# Patient Record
Sex: MALE | Race: WHITE | Employment: FULL TIME | URBAN - METROPOLITAN AREA
[De-identification: names, ages, dates, MRNs, and addresses within clinical notes are randomized per-mention and may not be internally consistent; named-entity substitution may affect disease eponyms.]

---

## 2023-02-19 NOTE — PROGRESS NOTES
Chief Complaint   Patient presents with    Establish Care    Other     Disk in back, wants to see a specialist     Rash     On the bottom of foot     Referral - General     Gi, for colonscopy            ASSESSMENT/PLAN:  1. Encounter to establish care  VS reviewed     BMI reviewed   All questions answered. F/u discussed. Appropriate healthy lifestyle modifications discussed. 2. Chronic midline low back pain without sciatica  10+ years, has failed all conservative measures and would like to look into options for surgical procedures. Herniated disc, Per exam, likely L5 L4 or L4 L3. No red flags at this time. Referral placed  - [facility] - Florencia Herron MD, Neurosurgery (Spine), [facility]    3. Left foot pain  Rule out bony abnormality of left foot given symptoms  If structure intact and no abnormalities found, will refer to Dr. Hiral De Leon or discussed more sensitive imaging.  - XR FOOT LEFT (MIN 3 VIEWS); Future    4. History of hemorrhoids  5. Anal fissure  Patient has pain at times with defecation with blood streaked on stool. Unsure if there is a hemorrhoid or fissure, deferred exam.  Would like referral to evaluate and treat  - Terrell Oneil MD, Colorectal Surgery, [facility]    6. Blood in stool  Referral placed for consult and possible colonoscopy  Sometimes blood is mixed in stool. No first-degree relatives with colon cancer. No symptoms of night sweats, weight loss or decreased appetite. No abdominal pain. - Juanis Arriola MD, Gastroenterology, [facility]    7. Snores  8. Daytime sleepiness  Referral for sleep study to rule out sleep apnea  - Damaris Noriega MD, Sleep Medicine [facility]    9. Immunization due  Administer Tdap           HPI:  Carrolyn Simmonds is a 42 y.o. (: 1985) here today to establish care and discuss chronic condition management.     Has lumbar disc herniation: 10 years ago- has been through PT, chiro, core strengthening with [email or web address] method, steroid injections and is now considering surgery to maintain a decent baseline quality of life. He is able to exercise and do normal activities of daily living but walking for long period, sitting for long period or lifting anything over 20 pounds causes an issue. Left foot pain. Started months ago. Was wearing tight shoes and walk for long period of time. Feels a hard pebble with each step, worse on hard surfaces versus soft surfaces. Has not been getting worse but stable. H/o anal fissure as well as hemorrhoids. Was seeing a gastroenterologist in [place] and moved [place] just before his colonoscopy appointment for blood in stool. Would like to reestablish with gastroenterology here and get a colonoscopy. Will have records sent. Snores and has daytime sleepiness. Always needs a nap. Never feels well rested. Is unaware of abnormal breathing patterns while sleeping. Would like to rule out sleep apnea. Needs to update Tdap shot      Review of Systems   Constitutional:  Positive for fatigue. Negative for activity change, chills and fever. HENT:  Negative for congestion. Eyes:  Negative for redness. Respiratory:  Negative for cough, chest tightness, shortness of breath and wheezing. Cardiovascular:  Negative for chest pain and palpitations. Gastrointestinal:  Negative for abdominal pain and diarrhea. Genitourinary:  Negative for difficulty urinating. Musculoskeletal:  Positive for back pain and myalgias. Negative for arthralgias. Skin:  Negative for rash. Allergic/Immunologic: Negative for immunocompromised state. Neurological:  Negative for dizziness, light-headedness and headaches. Hematological:  Negative for adenopathy. Psychiatric/Behavioral:  Positive for sleep disturbance. Negative for behavioral problems, self-injury and suicidal ideas. The patient is not nervous/anxious. History reviewed. No pertinent past medical history. History reviewed.  No pertinent family history. Social History     Tobacco Use    Smoking status: Never    Smokeless tobacco: Never   Vaping Use    Vaping Use: Never used   Substance Use Topics    Alcohol use: Yes     Comment: occo    Drug use: Never       New Prescriptions    No medications on file       Meds Prior to visit:  No current outpatient medications on file prior to visit. No current facility-administered medications on file prior to visit. No Known Allergies    OBJECTIVE:  /73   Pulse 70   Temp 97 °F (36.1 °C) (Temporal)   Ht 6' 0.93\" (1.852 m)   Wt 219 lb 3.2 oz (99.4 kg)   BMI 28.97 kg/m²   BP Readings from Last 2 Encounters:   02/21/23 118/73     Wt Readings from Last 3 Encounters:   02/21/23 219 lb 3.2 oz (99.4 kg)       Physical Exam  Vitals and nursing note reviewed. Constitutional:       General: He is not in acute distress. Appearance: Normal appearance. He is well-developed and normal weight. He is not ill-appearing. HENT:      Head: Normocephalic and atraumatic. Right Ear: Tympanic membrane, ear canal and external ear normal. There is no impacted cerumen. Left Ear: Tympanic membrane, ear canal and external ear normal. There is no impacted cerumen. Nose: Nose normal. No congestion or rhinorrhea. Mouth/Throat:      Mouth: Mucous membranes are moist.      Pharynx: Oropharynx is clear. No oropharyngeal exudate or posterior oropharyngeal erythema. Eyes:      General: No scleral icterus. Right eye: No discharge. Left eye: No discharge. Extraocular Movements: Extraocular movements intact. Conjunctiva/sclera: Conjunctivae normal.      Pupils: Pupils are equal, round, and reactive to light. Cardiovascular:      Rate and Rhythm: Normal rate and regular rhythm. Pulses: Normal pulses. Heart sounds: Normal heart sounds. No murmur heard.      Comments: Normal radial and pedal pulses  Pulmonary:      Effort: Pulmonary effort is normal. No respiratory distress. Breath sounds: Normal breath sounds. No wheezing. Chest:      Chest wall: No tenderness. Abdominal:      General: Bowel sounds are normal. There is no distension. Palpations: Abdomen is soft. There is no mass. Tenderness: There is no abdominal tenderness. Comments: Normal liver and spleen, no organomegaly. Musculoskeletal:         General: No tenderness, deformity or signs of injury. Normal range of motion. Cervical back: Normal range of motion and neck supple. Back:       Right lower leg: No edema. Left lower leg: No edema. Feet:       Comments: Range of motion intact in all extremities   Lymphadenopathy:      Cervical: No cervical adenopathy. Skin:     General: Skin is warm and dry. Capillary Refill: Capillary refill takes less than 2 seconds. Findings: No erythema or rash. Neurological:      General: No focal deficit present. Mental Status: He is alert and oriented to person, place, and time. Mental status is at baseline. Sensory: No sensory deficit. Motor: No abnormal muscle tone. Coordination: Coordination normal.   Psychiatric:         Mood and Affect: Mood normal.         Behavior: Behavior normal.         Thought Content: Thought content normal.         Judgment: Judgment normal.      Comments:         Discussed use, benefit, and side effects of prescribed medications. Barriers to medication compliance addressed. All patient questions answered. Pt voiced understanding. RTC No follow-ups on file. No future appointments.       Mary Sam MD  2/21/2023  3:58 PM

## 2023-02-21 ENCOUNTER — OFFICE VISIT (OUTPATIENT)
Dept: PRIMARY CARE CLINIC | Age: 38
End: 2023-02-21
Payer: COMMERCIAL

## 2023-02-21 ENCOUNTER — HOSPITAL ENCOUNTER (OUTPATIENT)
Dept: GENERAL RADIOLOGY | Age: 38
Discharge: HOME | End: 2023-02-21
Payer: COMMERCIAL

## 2023-02-21 ENCOUNTER — HOSPITAL ENCOUNTER (OUTPATIENT)
Age: 38
Discharge: HOME | End: 2023-02-21
Payer: COMMERCIAL

## 2023-02-21 VITALS
TEMPERATURE: 97 F | SYSTOLIC BLOOD PRESSURE: 118 MMHG | HEIGHT: 73 IN | HEART RATE: 70 BPM | BODY MASS INDEX: 29.05 KG/M2 | WEIGHT: 219.2 LBS | DIASTOLIC BLOOD PRESSURE: 73 MMHG

## 2023-02-21 DIAGNOSIS — M79.672 LEFT FOOT PAIN: ICD-10-CM

## 2023-02-21 DIAGNOSIS — K60.2 ANAL FISSURE: ICD-10-CM

## 2023-02-21 DIAGNOSIS — G89.29 CHRONIC MIDLINE LOW BACK PAIN WITHOUT SCIATICA: ICD-10-CM

## 2023-02-21 DIAGNOSIS — R40.0 DAYTIME SLEEPINESS: ICD-10-CM

## 2023-02-21 DIAGNOSIS — Z23 IMMUNIZATION DUE: ICD-10-CM

## 2023-02-21 DIAGNOSIS — Z76.89 ENCOUNTER TO ESTABLISH CARE: Primary | ICD-10-CM

## 2023-02-21 DIAGNOSIS — M54.50 CHRONIC MIDLINE LOW BACK PAIN WITHOUT SCIATICA: ICD-10-CM

## 2023-02-21 DIAGNOSIS — K92.1 BLOOD IN STOOL: ICD-10-CM

## 2023-02-21 DIAGNOSIS — Z87.19 HISTORY OF HEMORRHOIDS: ICD-10-CM

## 2023-02-21 DIAGNOSIS — R06.83 SNORES: ICD-10-CM

## 2023-02-21 PROCEDURE — 90471 IMMUNIZATION ADMIN: CPT | Performed by: FAMILY MEDICINE

## 2023-02-21 PROCEDURE — 99204 OFFICE O/P NEW MOD 45 MIN: CPT | Performed by: FAMILY MEDICINE

## 2023-02-21 PROCEDURE — 90715 TDAP VACCINE 7 YRS/> IM: CPT | Performed by: FAMILY MEDICINE

## 2023-02-21 PROCEDURE — 73630 X-RAY EXAM OF FOOT: CPT

## 2023-02-21 SDOH — ECONOMIC STABILITY: HOUSING INSECURITY
IN THE LAST 12 MONTHS, WAS THERE A TIME WHEN YOU DID NOT HAVE A STEADY PLACE TO SLEEP OR SLEPT IN A SHELTER (INCLUDING NOW)?: NO

## 2023-02-21 SDOH — ECONOMIC STABILITY: FOOD INSECURITY: WITHIN THE PAST 12 MONTHS, YOU WORRIED THAT YOUR FOOD WOULD RUN OUT BEFORE YOU GOT MONEY TO BUY MORE.: NEVER TRUE

## 2023-02-21 SDOH — ECONOMIC STABILITY: FOOD INSECURITY: WITHIN THE PAST 12 MONTHS, THE FOOD YOU BOUGHT JUST DIDN'T LAST AND YOU DIDN'T HAVE MONEY TO GET MORE.: NEVER TRUE

## 2023-02-21 SDOH — ECONOMIC STABILITY: INCOME INSECURITY: HOW HARD IS IT FOR YOU TO PAY FOR THE VERY BASICS LIKE FOOD, HOUSING, MEDICAL CARE, AND HEATING?: NOT HARD AT ALL

## 2023-02-21 ASSESSMENT — ENCOUNTER SYMPTOMS
COUGH: 0
DIARRHEA: 0
SHORTNESS OF BREATH: 0
EYE REDNESS: 0
WHEEZING: 0
ABDOMINAL PAIN: 0
CHEST TIGHTNESS: 0
BACK PAIN: 1

## 2023-02-21 ASSESSMENT — PATIENT HEALTH QUESTIONNAIRE - PHQ9
SUM OF ALL RESPONSES TO PHQ QUESTIONS 1-9: 0
SUM OF ALL RESPONSES TO PHQ QUESTIONS 1-9: 0
2. FEELING DOWN, DEPRESSED OR HOPELESS: 0
1. LITTLE INTEREST OR PLEASURE IN DOING THINGS: 0
SUM OF ALL RESPONSES TO PHQ QUESTIONS 1-9: 0
SUM OF ALL RESPONSES TO PHQ QUESTIONS 1-9: 0
SUM OF ALL RESPONSES TO PHQ9 QUESTIONS 1 & 2: 0

## 2023-03-08 ENCOUNTER — PATIENT MESSAGE (OUTPATIENT)
Dept: PRIMARY CARE CLINIC | Age: 38
End: 2023-03-08

## 2023-03-08 DIAGNOSIS — L91.8 SKIN TAG: Primary | ICD-10-CM

## 2023-03-08 NOTE — TELEPHONE ENCOUNTER
From: Soren Morales  To: Dr. Foster Outlaw: 3/8/2023 3:44 PM EST  Subject: Dermatologist Referral     Hi Dr. Rahul Bueno,    I hope you are doing well. I have long-standing skin tags (mostly on my neck and shoulders) that Id like to have removed. I have had some removed in the past.     Would it be possible for you to refer me to a dermatologist who might be able to do this?     Thank you!  -Doyle

## 2023-03-21 ENCOUNTER — OFFICE VISIT (OUTPATIENT)
Dept: SURGERY | Age: 38
End: 2023-03-21
Payer: COMMERCIAL

## 2023-03-21 ENCOUNTER — TELEPHONE (OUTPATIENT)
Dept: SURGERY | Age: 38
End: 2023-03-21

## 2023-03-21 VITALS
TEMPERATURE: 97.2 F | WEIGHT: 219 LBS | HEIGHT: 72 IN | BODY MASS INDEX: 29.66 KG/M2 | DIASTOLIC BLOOD PRESSURE: 83 MMHG | RESPIRATION RATE: 16 BRPM | OXYGEN SATURATION: 96 % | HEART RATE: 108 BPM | SYSTOLIC BLOOD PRESSURE: 132 MMHG

## 2023-03-21 DIAGNOSIS — K60.3 FISTULA-IN-ANO: Primary | ICD-10-CM

## 2023-03-21 DIAGNOSIS — K62.5 RECTAL BLEEDING: ICD-10-CM

## 2023-03-21 PROCEDURE — 99204 OFFICE O/P NEW MOD 45 MIN: CPT | Performed by: SURGERY

## 2023-03-21 RX ORDER — POLYETHYLENE GLYCOL 3350, SODIUM CHLORIDE, SODIUM BICARBONATE, POTASSIUM CHLORIDE 420; 11.2; 5.72; 1.48 G/4L; G/4L; G/4L; G/4L
POWDER, FOR SOLUTION ORAL
Qty: 1 EACH | Refills: 0 | Status: SHIPPED | OUTPATIENT
Start: 2023-03-21

## 2023-03-21 NOTE — PROGRESS NOTES
of anal fissure  Additional workup/treatment planned: Colonoscopy, then plan EUA when he is ready  Risk of complications/morbidity: Moderate    I discussed with Doyle my concern for possible anal fistula. This may be from chronic nonhealing of his previous fissure site or could be completely new issue. Regardless, we spent some time discussing the differences between fissure, fistula, and hemorrhoids. I do recommend at some point for further evaluation we plan for examination under anesthesia and possible fistulotomy versus seton placement. At this point he does not necessarily want to plan for surgery ASAP, but he does have some concerns of whether this could be bleeding from another source or even something like colon cancer. He has never had a previous colonoscopy, so we can plan for this in the coming weeks for reassurance. This may give me the opportunity to get a better exam as well. We can then plan for formal EUA down the road when he is ready. Continue with current medications    DISPOSITION:  plan C_scope soon    My findings will be relayed to consulting practitioner or PCP via Epic    Note completed using dictation software, please excuse any errors.     Electronically signed by Terryl Epley, MD on 3/21/2023 at 3:12 PM

## 2023-03-21 NOTE — TELEPHONE ENCOUNTER
Patient has been scheduled for:    Procedure: Colonoscopy  Date: 5/17/23  Time: 9:00AM  Arrival: 7:30AM  [facility]  [facility] none  Prep? Glenys, discussed in office and gave paper instructions. Pre-op? none    Post-op Appt? Patient advised they will need a . I let him know he cannot use uber, lyft, taxi. Orders routed to surgery scheduling. Instructions have been mailed/emailed to:  [email or web address]    Golytely sent to pharmacy

## 2023-03-27 ENCOUNTER — PATIENT MESSAGE (OUTPATIENT)
Dept: PRIMARY CARE CLINIC | Age: 38
End: 2023-03-27

## 2023-03-27 DIAGNOSIS — M79.672 LEFT FOOT PAIN: Primary | ICD-10-CM

## 2023-05-16 ENCOUNTER — TELEPHONE (OUTPATIENT)
Dept: SURGERY | Age: 38
End: 2023-05-16

## 2023-05-16 ENCOUNTER — ANESTHESIA EVENT (OUTPATIENT)
Dept: ENDOSCOPY | Age: 38
End: 2023-05-16
Payer: COMMERCIAL

## 2023-05-16 NOTE — TELEPHONE ENCOUNTER
I have placed a reminder call to patient for upcoming procedure. Did you speak directly to patient or leave a voicemail? Spoke to patient    Prep? Golytely    Must have a  that is over the age of 25. Must be a friend or family member that can be responsible for signing them out after surgery.     Arrive at the main entrance [facility] at 7:30am

## 2023-05-17 ENCOUNTER — HOSPITAL ENCOUNTER (OUTPATIENT)
Age: 38
Setting detail: OUTPATIENT SURGERY
Discharge: HOME | End: 2023-05-17
Attending: SURGERY | Admitting: SURGERY
Payer: COMMERCIAL

## 2023-05-17 ENCOUNTER — ANESTHESIA (OUTPATIENT)
Dept: ENDOSCOPY | Age: 38
End: 2023-05-17
Payer: COMMERCIAL

## 2023-05-17 VITALS
RESPIRATION RATE: 16 BRPM | HEART RATE: 54 BPM | OXYGEN SATURATION: 100 % | SYSTOLIC BLOOD PRESSURE: 101 MMHG | BODY MASS INDEX: 26.95 KG/M2 | HEIGHT: 74 IN | WEIGHT: 210 LBS | TEMPERATURE: 96.8 F | DIASTOLIC BLOOD PRESSURE: 63 MMHG

## 2023-05-17 PROBLEM — K62.5 RECTAL BLEEDING: Status: ACTIVE | Noted: 2023-05-17

## 2023-05-17 PROCEDURE — 3700000001 HC ADD 15 MINUTES (ANESTHESIA): Performed by: SURGERY

## 2023-05-17 PROCEDURE — 7100000010 HC PHASE II RECOVERY - FIRST 15 MIN: Performed by: SURGERY

## 2023-05-17 PROCEDURE — 2580000003 HC RX 258: Performed by: NURSE ANESTHETIST, CERTIFIED REGISTERED

## 2023-05-17 PROCEDURE — 2580000003 HC RX 258: Performed by: ANESTHESIOLOGY

## 2023-05-17 PROCEDURE — 45378 DIAGNOSTIC COLONOSCOPY: CPT | Performed by: SURGERY

## 2023-05-17 PROCEDURE — 2500000003 HC RX 250 WO HCPCS: Performed by: NURSE ANESTHETIST, CERTIFIED REGISTERED

## 2023-05-17 PROCEDURE — 3700000000 HC ANESTHESIA ATTENDED CARE: Performed by: SURGERY

## 2023-05-17 PROCEDURE — 6360000002 HC RX W HCPCS: Performed by: NURSE ANESTHETIST, CERTIFIED REGISTERED

## 2023-05-17 PROCEDURE — 3609027000 HC COLONOSCOPY: Performed by: SURGERY

## 2023-05-17 PROCEDURE — 7100000011 HC PHASE II RECOVERY - ADDTL 15 MIN: Performed by: SURGERY

## 2023-05-17 PROCEDURE — 2709999900 HC NON-CHARGEABLE SUPPLY: Performed by: SURGERY

## 2023-05-17 RX ORDER — SODIUM CHLORIDE, SODIUM LACTATE, POTASSIUM CHLORIDE, CALCIUM CHLORIDE 600; 310; 30; 20 MG/100ML; MG/100ML; MG/100ML; MG/100ML
INJECTION, SOLUTION INTRAVENOUS CONTINUOUS
Status: DISCONTINUED | OUTPATIENT
Start: 2023-05-17 | End: 2023-05-17 | Stop reason: HOSPADM

## 2023-05-17 RX ORDER — LIDOCAINE HYDROCHLORIDE 20 MG/ML
INJECTION, SOLUTION INFILTRATION; PERINEURAL PRN
Status: DISCONTINUED | OUTPATIENT
Start: 2023-05-17 | End: 2023-05-17 | Stop reason: SDUPTHER

## 2023-05-17 RX ORDER — SODIUM CHLORIDE, SODIUM LACTATE, POTASSIUM CHLORIDE, CALCIUM CHLORIDE 600; 310; 30; 20 MG/100ML; MG/100ML; MG/100ML; MG/100ML
INJECTION, SOLUTION INTRAVENOUS CONTINUOUS PRN
Status: DISCONTINUED | OUTPATIENT
Start: 2023-05-17 | End: 2023-05-17 | Stop reason: SDUPTHER

## 2023-05-17 RX ORDER — PROPOFOL 10 MG/ML
INJECTION, EMULSION INTRAVENOUS PRN
Status: DISCONTINUED | OUTPATIENT
Start: 2023-05-17 | End: 2023-05-17 | Stop reason: SDUPTHER

## 2023-05-17 RX ADMIN — LIDOCAINE HYDROCHLORIDE 100 MG: 20 INJECTION, SOLUTION INFILTRATION; PERINEURAL at 08:51

## 2023-05-17 RX ADMIN — PROPOFOL 50 MG: 10 INJECTION, EMULSION INTRAVENOUS at 08:52

## 2023-05-17 RX ADMIN — PROPOFOL 50 MG: 10 INJECTION, EMULSION INTRAVENOUS at 08:55

## 2023-05-17 RX ADMIN — PROPOFOL 50 MG: 10 INJECTION, EMULSION INTRAVENOUS at 08:54

## 2023-05-17 RX ADMIN — PROPOFOL 100 MG: 10 INJECTION, EMULSION INTRAVENOUS at 08:51

## 2023-05-17 RX ADMIN — SODIUM CHLORIDE, SODIUM LACTATE, POTASSIUM CHLORIDE, AND CALCIUM CHLORIDE: .6; .31; .03; .02 INJECTION, SOLUTION INTRAVENOUS at 08:48

## 2023-05-17 RX ADMIN — PROPOFOL 50 MG: 10 INJECTION, EMULSION INTRAVENOUS at 08:57

## 2023-05-17 RX ADMIN — PROPOFOL 50 MG: 10 INJECTION, EMULSION INTRAVENOUS at 08:53

## 2023-05-17 RX ADMIN — SODIUM CHLORIDE, SODIUM LACTATE, POTASSIUM CHLORIDE, AND CALCIUM CHLORIDE: .6; .31; .03; .02 INJECTION, SOLUTION INTRAVENOUS at 08:15

## 2023-05-17 ASSESSMENT — PAIN SCALES - GENERAL
PAINLEVEL_OUTOF10: 0

## 2023-05-17 ASSESSMENT — PAIN - FUNCTIONAL ASSESSMENT: PAIN_FUNCTIONAL_ASSESSMENT: 0-10

## 2023-05-17 NOTE — ANESTHESIA PRE PROCEDURE
Department of Anesthesiology  Preprocedure Note       Name:  Margoth Siegel   Age:  42 y.o.  :  1985                                          MRN:  [id number]         Date:  2023      Surgeon: Violeta Dove):  Hipolito Ervin MD    Procedure: Procedure(s):  COLONOSCOPY POSSIBLE POLYPECTOMY    Medications prior to admission:   Prior to Admission medications    Medication Sig Start Date End Date Taking? Authorizing Provider   polyethylene glycol-electrolytes (NULYTELY) 420 g solution TAKE AS DIRECTED DAY PRIOR TO COLONOSCOPY 3/21/23   Hipolito Ervin MD       Current medications:    No current facility-administered medications for this encounter. Allergies:  No Known Allergies    Problem List:  There is no problem list on file for this patient. Past Medical History:  History reviewed. No pertinent past medical history.     Past Surgical History:        Procedure Laterality Date    STOMACH SURGERY         Social History:    Social History     Tobacco Use    Smoking status: Former     Types: Cigarettes    Smokeless tobacco: Never    Tobacco comments:     Was a cigarette smoker for 7-8 years and currently vapes    Substance Use Topics    Alcohol use: Yes     Comment: occo                                Counseling given: Not Answered  Tobacco comments: Was a cigarette smoker for 7-8 years and currently vapes       Vital Signs (Current):   Vitals:    23 0751   BP: 108/65   Pulse: 58   Resp: 16   Temp: 97.1 °F (36.2 °C)   TempSrc: Temporal   SpO2: 100%   Weight: 210 lb (95.3 kg)   Height: 6' 2\" (1.88 m)                                              BP Readings from Last 3 Encounters:   23 108/65   23 132/83   23 118/73       NPO Status:                                                                                 BMI:   Wt Readings from Last 3 Encounters:   23 210 lb (95.3 kg)   23 219 lb (99.3 kg)   23 219 lb 3.2 oz (99.4 kg)     Body mass index is 26.96

## 2023-05-17 NOTE — DISCHARGE INSTRUCTIONS
WHAT TO EXPECT AFTER YOUR COLONOSCOPY - DR. HERNANDEZ          The nurses will watch you in the recovery area for 1 to 2 hours until the medicines wear off. Then you can go home. You will need a ride. You can resume a normal diet after the procedure. You are legally not allowed to drive or operate machinery after the procedure, as you will have received sedation. You should avoid alcohol until the next day. Do not plan on going back to work after your procedure. If you are on any blood thinners, such as aspirin, Plavix, Coumadin, Xarelto, Eliquis, etc., be sure to ask your physician when you may resume these. This will depend on the reason for the medication usage, and if any polyps were removed during the procedure. All of your other medications you can resume normally. Your doctor will talk to you about when you will need your next colonoscopy. This will depend on the results of the colonoscopy and your medical and family history. Complications:    Colonoscopy is generally a very safe procedure with low complication risk. Common complaints after the procedure include bloating and excessive flatulence, and occasionally nausea. This is normal.     Other complications include: Anesthesia/sedation issues  Bleeding, especially if polyps are removed (uncommon)  Most bleeding will stop on its own, but occasionally can require a another colonoscopy, blood products, or hospital admission  This risk is slightly higher in patients on blood thinners. Perforation, or a hole in the colon, which can require hospital admission or emergency surgery (very rare)    Call the office [phone] or go to your nearest emergency room if you have fever greater than 101º, severe abdominal pain that does not get better after a few hours, or rectal bleeding that does not stop after a few hours. You may also call the office with any non-emergency questions or concerns.      Follow-up care is a key part of your treatment and

## 2023-05-17 NOTE — PROGRESS NOTES
Ambulatory Surgery/Procedure Discharge Note    Vitals:    05/17/23 0921   BP: 101/63   Pulse: 54   Resp: 16   Temp:    SpO2: 100%       In: 300 [P.O.:100; I.V.:200]  Out: -     Restroom use offered before discharge. Yes    Pain assessment:  none  Pain Level: 0        Patient discharged to home/self care.  Patient discharged via wheel chair by transporter to waiting family/S.O.       5/17/2023 9:59 AM

## 2023-05-17 NOTE — H&P
PRE-ENDOSCOPY H&P    Visit Date: 5/17/2023    History:     Davi Singh is a 42 y.o. male who presents today for endoscopy procedure. See A/P below for indications. There is no problem list on file for this patient. Scheduled Meds:  Continuous Infusions:   lactated ringers IV soln 125 mL/hr at 05/17/23 0815     PRN Meds:. Prior to Admission medications    Medication Sig Start Date End Date Taking? 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 Constitutional: Appears well-developed and well-nourished. Grooming appropriate. Head: Normocephalic, atraumatic. Eyes: No scleral icterus. Vision intact grossly. ENT: Hearing grossly intact. No facial deformity. Cardiovascular: Normal rate on monitor. Pulmonary/Chest: Effort normal. No respiratory distress. No wheezes. No use of accessory muscles. Musculoskeletal: Normal range of motion of UE. No gross deformity. Neurological: Alert and oriented to person, place, and time. No gross deficits. Psychiatric: Normal mood and affect. Behavior normal. Oriented to person, place, and time. Abdomen: soft, NTTP, non distended    Recent labs/radiology reviewed as appropriate    Assessment/Plan:     Previous colonoscopy: no  Family history of colorectal cancer: no  Symptoms: yes - rectal bleeding    Anesthesia to provide sedation. Please see their documentation regarding airway and ASA classification.     Proceed as planned for endoscopy, possible

## 2023-05-17 NOTE — ANESTHESIA POSTPROCEDURE EVALUATION
Department of Anesthesiology  Postprocedure Note    Patient: Julio Cesar Louie  MRN: [id number]  YOB: 1985  Date of evaluation: 5/17/2023      Procedure Summary     Date: 05/17/23 Room / Location: [facility]    Anesthesia Start: 4613 Anesthesia Stop: 0202    Procedure: COLONOSCOPY DIAGNOSTIC Diagnosis:       Colon cancer screening      (Colon cancer screening [Z12.11])    Surgeons: April Shook MD Responsible Provider: Lisa Quesada MD    Anesthesia Type: MAC ASA Status: 1          Anesthesia Type: No value filed.     Keegan Phase I: Keegan Score: 10    Keegan Phase II:        Anesthesia Post Evaluation    Patient location during evaluation: PACU  Patient participation: complete - patient participated  Level of consciousness: awake and alert  Airway patency: patent  Nausea & Vomiting: no nausea and no vomiting  Complications: no  Cardiovascular status: hemodynamically stable  Respiratory status: acceptable  Hydration status: euvolemic  Multimodal analgesia pain management approach

## 2023-08-09 ENCOUNTER — HOSPITAL ENCOUNTER (OUTPATIENT)
Dept: SLEEP CENTER | Age: 38
Discharge: HOME | End: 2023-08-09
Payer: COMMERCIAL

## 2023-08-09 DIAGNOSIS — R06.83 SNORING: ICD-10-CM

## 2023-08-09 DIAGNOSIS — G47.10 HYPERSOMNIA: ICD-10-CM

## 2023-08-09 PROCEDURE — 95806 SLEEP STUDY UNATT&RESP EFFT: CPT

## 2023-08-14 ENCOUNTER — TELEPHONE (OUTPATIENT)
Dept: PULMONOLOGY | Age: 38
End: 2023-08-14

## 2023-08-14 NOTE — TELEPHONE ENCOUNTER
Pt saw sleep study results come through on [employer] and was wanting to schedule follow up to go over. I informed pt that Jim Cade will be reaching out to go over results and discuss next steps.

## 2023-08-15 ENCOUNTER — TELEPHONE (OUTPATIENT)
Dept: PULMONOLOGY | Age: 38
End: 2023-08-15

## 2023-08-15 NOTE — PROGRESS NOTES
Signed Date: 08/15/23    Electronically signed by Lukas Cabrera MD on 8/15/2023 at 1:59 PM    Doyle Garcia  1985  [address] [place] [address]  [phone] (home)   [phone] (mobile)      Insurance Info (confirm with patient if correct):  Payer/Plan Subscr  Sex Relation Sub. Ins. ID Effective Group Num   1.  [id number] [facility] 1985 Male Self [id number] 10/1/22 [id number]                                    [id number]

## 2023-09-07 ENCOUNTER — TELEPHONE (OUTPATIENT)
Dept: PULMONOLOGY | Age: 38
End: 2023-09-07

## 2023-09-07 NOTE — TELEPHONE ENCOUNTER
Pt called in stating that he is having trouble with his machine. Pt feels the pressure is too high and he keeps waking up due to air in his lungs. Please give pt an update on a possible pressure change. CNFU scheduled.      [phone]

## 2023-09-26 NOTE — TELEPHONE ENCOUNTER
Pt called in stating that he is looking for a recommendation for his next step. Pt stated that he began with a nasal mask and that did not work for him so he did a mask refit with the mask that covered the mouth and nose. That mask did not work for him either. PT spoke to his [employer] company and they stated that in order to get that mask, he would have to pay for it out of pocket. Pt stated that the problem he had with the second mask is the lack of oxygen so he only used it for two nights. Should pt move forward with getting the new mask? Please advise pt on next steps on getting a mask that works for him.      [phone]

## 2023-09-27 NOTE — TELEPHONE ENCOUNTER
Spoke with pt to let him know pressure change was made and Dr. Gwendolyn Dye would like for him to continue with use of FFM

## 2023-11-14 PROBLEM — G47.33 OBSTRUCTIVE SLEEP APNEA SYNDROME: Status: ACTIVE | Noted: 2023-11-14

## 2023-12-13 ENCOUNTER — TELEPHONE (OUTPATIENT)
Dept: PULMONOLOGY | Age: 38
End: 2023-12-13

## 2023-12-13 NOTE — TELEPHONE ENCOUNTER
LM asking pt to call. [employer] [facility] is having technical difficulty with activating the modem. Our office to be called when activation is successful.

## 2024-02-13 PROBLEM — G47.33 OBSTRUCTIVE SLEEP APNEA SYNDROME: Chronic | Status: ACTIVE | Noted: 2023-11-14

## 2024-03-11 ENCOUNTER — TELEPHONE (OUTPATIENT)
Dept: PULMONOLOGY | Age: 39
End: 2024-03-11

## 2024-03-11 NOTE — TELEPHONE ENCOUNTER
Pt called stating he is having trouble with nose pillows. Nose pillows is making it difficult to breathe. Pt is also is experiencing dry mouth. Please advise pt.    [phone], call anytime after 10 am.

## 2024-03-12 ENCOUNTER — TELEPHONE (OUTPATIENT)
Dept: PULMONOLOGY | Age: 39
End: 2024-03-12

## 2024-03-12 NOTE — TELEPHONE ENCOUNTER
Spoke with pt to review issues he is having.  Compliance showing leaks.  Pt to schedule a mask refit with his DME.  Humidifier settings changed and pt was asked to call when he is with his pap unit to review how to adjust the humidity.

## 2024-03-13 ENCOUNTER — TELEPHONE (OUTPATIENT)
Dept: PULMONOLOGY | Age: 39
End: 2024-03-13

## 2024-03-13 NOTE — TELEPHONE ENCOUNTER
Pt calling to review how to set humidifier. Pt appeared to understand  Pt to schedule a mask refit with his DME

## 2024-03-13 NOTE — TELEPHONE ENCOUNTER
Pt called, he had some questions for Jessie regarding his compliance showing leakage. Jessie was unavailable. Please call pt back at [phone]

## 2024-03-13 NOTE — TELEPHONE ENCOUNTER
Pt called stating [employer] needs a new order for a full face mask faxed to them. Pt is scheduled for mask re-fitting on 03/21/24. Please notify pt once order is sent.     PH:[phone]

## 2024-03-21 NOTE — PROGRESS NOTES
Doyle Garcia         : 1985  [facility]    Diagnosis: [x] VIRI (G47.33) [] CSA (G47.31) [] Apnea (G47.30)   Length of Need: [x] 18 Months [] 99 Months [] Other:    Machine (FREDY!): [] Respironics Dream Station   2   Auto [] ResMed AirSense     Auto S11 or S10 (if bilevel) [] Other:     []  CPAP () [] Bilevel ()   Mode: [] Auto [] Spontaneous    Mode: [] Auto [] Spontaneous            Comfort Settings:   - Ramp Pressure: 5 cmH2O                                        - Ramp time: 15 min                                     -  Flex/EPR - 3 full time                                    - For ResMed Bilevel (TiMax-4 sec   TiMin- 0.2 sec)     Humidifier: [] Heated ()        [x] Water chamber replacement ()/ 1 per 6 months        Mask:   [x] Nasal () /1 per 3 months [x] Full Face () /1 per 3 months   [x] Patient choice -Size and fit mask [x] Patient Choice - Size and fit mask   [] Dispense:  [] Dispense:    [x] Headgear () / 1 per 3 months [x] Headgear () / 1 per 3 months   [x] Replacement Nasal Cushion ()/2 per month [x] Interface Replacement ()/1 per month   [x] Replacement Nasal Pillows ()/2 per month         Tubing: [x] Heated ()/1 per 3 months    [] Standard ()/1 per 3 months [] Other:           Filters: [x] Non-disposable ()/1 per 6 months     [x] Ultra-Fine, Disposable ()/2 per month        Miscellaneous: [] Chin Strap ()/ 1 per 6 months [] O2 bleed-in:       LPM   [] Oximetry on CPAP/Bilevel []  Other:    [] Modem: ()         Start Order Date: 24    MEDICAL JUSTIFICATION:  I, the undersigned, certify that the above prescribed supplies are medically necessary for this patient’s wellbeing.  In my opinion, the supplies are both reasonable and necessary in reference to accepted standards of medicalpractice in treatment of this patient’s condition.    ALAN THOMAS MD      NPI: [id number]       Order Signed Date:

## 2024-04-26 ENCOUNTER — TELEPHONE (OUTPATIENT)
Dept: PULMONOLOGY | Age: 39
End: 2024-04-26

## 2024-04-26 NOTE — TELEPHONE ENCOUNTER
Pt called stating [facility] told him that he was non compliant with machine usage. Pt stated he was feeling claustrophobic when using mask, so he was taking it off during the night. Pt wants to return machine to avoid paying out of pocket. Please advise pt on what he should do.    PH:[phone], can call back anytime.

## 2024-04-29 NOTE — TELEPHONE ENCOUNTER
Last OV 2/13/24- left message for patient to offer an office visit to discuss issues. Let patient know he would return his equipment to DME and sign off on AMA form.

## (undated) DEVICE — CANNULA SAMP CO2 AD GRN 7FT CO2 AND 7FT O2 TBNG UNIV CONN